# Patient Record
(demographics unavailable — no encounter records)

---

## 2025-01-06 NOTE — REVIEW OF SYSTEMS
[Constipation] : constipation [Fatigue] : no fatigue [Decreased Appetite] : appetite not decreased [Recent Weight Gain (___ Lbs)] : no recent weight gain [Recent Weight Loss (___ Lbs)] : no recent weight loss [Visual Field Defect] : no visual field defect [Blurred Vision] : no blurred vision [Dysphagia] : no dysphagia [Neck Pain] : no neck pain [Dysphonia] : no dysphonia [Chest Pain] : no chest pain [Palpitations] : no palpitations [Diarrhea] : no diarrhea [Dry Skin] : no dry skin [Hair Loss] : no hair loss [Headaches] : no headaches [Tremors] : no tremors [Depression] : no depression [Anxiety] : no anxiety [FreeTextEntry2] : weight stable

## 2025-01-06 NOTE — ASSESSMENT
[FreeTextEntry1] : 78 y/o female with Prediabetes, Hyperlipidemia, and MNG. TSH wnl  Prediabetes: continue lifestyle modifications  Hyperlipidemia: controlled with statin  MNG: repeat thyroid plus parathyroid sonogram now  Hypercalcemia: check Calcium ionized, PTH, vitamin D, obtain DEXA results, parathyroid sonogram.    RTO in 6 months with Dr. Grissom (due to Dr. Titus moving).

## 2025-01-06 NOTE — HISTORY OF PRESENT ILLNESS
[FreeTextEntry1] : Endoscopy this week due to feeling pills in throat and GERD.    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 was tx with Doxycycline x 3 week butlyme persisted and still on DOxy for another 2 weeks had marked inc iLFT and also hgb was low did spot tick on her Quality: MNG Severity: mild Duration: years Onset: sonogram Modifying Factors: TFs wnl Associated Symptoms: no neck pain, dysphonia, or dysphagia  Notes:  Current Regimen: none  Date of last thyroid sonogram: 1/23: Impression: bilateral sub-centimeter nodule, unchanged  Prediabetes: lifestyle modifications only - Self blood sugar monitoring 1 time a day, on and off, no low blood sugars  Hypercalcemia Calcium 10.8: for 2 years, unknown source, history of Osteopenia - last DEXA 2024. does not take oral calcium takes vitamin D 1000 units every other day

## 2025-02-25 NOTE — ASSESSMENT
[FreeTextEntry1] : 78 y/o female with Prediabetes, Hyperlipidemia, and MNG. TSH wnl  Prediabetes: continue lifestyle modifications  Hyperlipidemia: controlled with statin  MNG: Dr. Grissom looked at the ultrasound and stated not the best images but the right one is more complex looking and the bright spots are probably related to fluid rather than calcium  the other nodule is very hypoechoic but no other concerning features  patient did not want to wait 1 year for sonogram, script given to have sonogram done before next visit.   Hypercalcemia: Calcium and PTH normal, vitamin D, DEXA- showed Osteopenia, will continue to monitor.   RTO in 6 months with Dr. Grissom (due to Dr. Titus moving).

## 2025-02-25 NOTE — HISTORY OF PRESENT ILLNESS
[FreeTextEntry1] : Endoscopy this week due to feeling pills in throat and GERD.  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 does not take oral calcium takes vitamin D 1000 units every other day  Labs from 1/14/25 [facility] 99 Calcium 10.4 Calcium Ionized 5.4 PTH 73

## 2025-02-25 NOTE — REVIEW OF SYSTEMS
[Decreased Appetite] : decreased appetite [Constipation] : constipation [Dry Skin] : dry skin [Stress] : stress [Fatigue] : no fatigue [Recent Weight Gain (___ Lbs)] : no recent weight gain [Recent Weight Loss (___ Lbs)] : no recent weight loss [Visual Field Defect] : no visual field defect [Blurred Vision] : no blurred vision [Dysphagia] : no dysphagia [Neck Pain] : no neck pain [Dysphonia] : no dysphonia [Chest Pain] : no chest pain [Palpitations] : no palpitations [Diarrhea] : no diarrhea [Hair Loss] : no hair loss [Headaches] : no headaches [Tremors] : no tremors [Depression] : no depression [Anxiety] : no anxiety [FreeTextEntry2] : weight stable